# Patient Record
Sex: FEMALE | Race: WHITE | NOT HISPANIC OR LATINO | Employment: OTHER | URBAN - METROPOLITAN AREA
[De-identification: names, ages, dates, MRNs, and addresses within clinical notes are randomized per-mention and may not be internally consistent; named-entity substitution may affect disease eponyms.]

---

## 2023-12-13 ENCOUNTER — OFFICE VISIT (OUTPATIENT)
Dept: HEMATOLOGY/ONCOLOGY | Facility: CLINIC | Age: 83
End: 2023-12-13
Payer: MEDICARE

## 2023-12-13 VITALS
HEART RATE: 76 BPM | TEMPERATURE: 97.9 F | SYSTOLIC BLOOD PRESSURE: 167 MMHG | BODY MASS INDEX: 33.91 KG/M2 | DIASTOLIC BLOOD PRESSURE: 79 MMHG | HEIGHT: 59 IN | RESPIRATION RATE: 18 BRPM | OXYGEN SATURATION: 96 % | WEIGHT: 168.21 LBS

## 2023-12-13 DIAGNOSIS — N60.91 ATYPICAL DUCTAL HYPERPLASIA OF RIGHT BREAST: Primary | ICD-10-CM

## 2023-12-13 DIAGNOSIS — Z13.820 ENCOUNTER FOR SCREENING FOR OSTEOPOROSIS: ICD-10-CM

## 2023-12-13 PROCEDURE — 1126F AMNT PAIN NOTED NONE PRSNT: CPT | Performed by: NURSE PRACTITIONER

## 2023-12-13 PROCEDURE — 99213 OFFICE O/P EST LOW 20 MIN: CPT | Performed by: NURSE PRACTITIONER

## 2023-12-13 PROCEDURE — 1036F TOBACCO NON-USER: CPT | Performed by: NURSE PRACTITIONER

## 2023-12-13 RX ORDER — ANASTROZOLE 1 MG/1
1 TABLET ORAL DAILY
COMMUNITY
End: 2023-12-13 | Stop reason: SDUPTHER

## 2023-12-13 RX ORDER — LOSARTAN POTASSIUM 50 MG/1
50 TABLET ORAL DAILY
COMMUNITY

## 2023-12-13 RX ORDER — HYDROCHLOROTHIAZIDE 25 MG/1
25 TABLET ORAL DAILY
COMMUNITY

## 2023-12-13 RX ORDER — ANASTROZOLE 1 MG/1
1 TABLET ORAL DAILY
Qty: 90 TABLET | Refills: 3 | Status: SHIPPED | OUTPATIENT
Start: 2023-12-13 | End: 2024-12-12

## 2023-12-13 RX ORDER — SIMVASTATIN 40 MG/1
40 TABLET, FILM COATED ORAL NIGHTLY
COMMUNITY

## 2023-12-13 ASSESSMENT — PATIENT HEALTH QUESTIONNAIRE - PHQ9
1. LITTLE INTEREST OR PLEASURE IN DOING THINGS: NOT AT ALL
SUM OF ALL RESPONSES TO PHQ9 QUESTIONS 1 AND 2: 0
2. FEELING DOWN, DEPRESSED OR HOPELESS: NOT AT ALL

## 2023-12-13 ASSESSMENT — COLUMBIA-SUICIDE SEVERITY RATING SCALE - C-SSRS
1. IN THE PAST MONTH, HAVE YOU WISHED YOU WERE DEAD OR WISHED YOU COULD GO TO SLEEP AND NOT WAKE UP?: NO
6. HAVE YOU EVER DONE ANYTHING, STARTED TO DO ANYTHING, OR PREPARED TO DO ANYTHING TO END YOUR LIFE?: NO
2. HAVE YOU ACTUALLY HAD ANY THOUGHTS OF KILLING YOURSELF?: NO

## 2023-12-13 ASSESSMENT — ENCOUNTER SYMPTOMS
DEPRESSION: 0
OCCASIONAL FEELINGS OF UNSTEADINESS: 0
LOSS OF SENSATION IN FEET: 0

## 2023-12-13 ASSESSMENT — PAIN SCALES - GENERAL: PAINLEVEL: 0-NO PAIN

## 2023-12-13 NOTE — PROGRESS NOTES
Patient ID: Vee Mcnulty is a 83 y.o. female.    Cancer History:  Treatment Synopsis:  Routine screening mammogram at [facility] on October 3, 2019. This showed an abnormality in the right breast upper outer quadrant. Subsequent diagnostic study also confirmed a suspicious lesion. 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 This was a grade 2 tumor.     She underwent a lumpectomy with sentinel lymph node biopsy on January 10, 2020, that showed an 8 mm tumor with a positive anterior margin. Zero of one sentinel lymph node involved. She underwent a reexcision on January 29, 2020. There was some residual atypical ductal hyperplasia but otherwise negative.   She omitted radiation therapy.     Anastrazole starting in March 2020    Family History:  Sister with breast cancer in her 70's  Limited knowledge of relatives    Interval History:  Patient returns today for routine follow-up evaluation for history of breast cancer.  She continues on anastrozole and is not experiencing any side effects.  She denies any joint aches complaints hot flashes or mood swings.  She has not had any fever, chills or night sweats.  No cough, chest pain or shortness of breath.  No nausea or vomiting.  No constipation or diarrhea.  She continues to follow with Dr. Coleman for imaging.    Previously followed by Dr. Farias, Dr. Lane and Dr. Uribe    Review of Systems:  A review of systems has been completed and are negative for complaints except what is stated in the HPI and/or past medical history    Social History:  She has two boys, two grandchildren.  20pk year history   Lives with spouse    Allergies:  NKDA    Medications:  Medication list reviewed with patient and updated in EMR    Vital Signs:  /79 (BP Location: Right arm, Patient Position: Sitting, BP Cuff Size: Adult)   Pulse 76   Temp 36.6 °C (97.9 °F) (Temporal)    "Resp 18   Ht 1.49 m (4' 10.66\")   Wt 76.3 kg (168 lb 3.4 oz)   SpO2 96%   BMI 34.37 kg/m²     Physical Exam:  ECOG:  Pain:  Constitutional: Well developed, awake/alert/oriented x3, no distress, alert and cooperative  Eyes: PER. sclera anicteric  ENMT: Oral mucosa moist  Respiratory/Thorax: Breathing is non-labored. Lungs are clear to auscultation bilaterally. No adventitious breath sounds  Cardiovascular: S1-S2. Regular rate and rhythm. No murmurs, rubs, or gallops appreciated  Gastrointestinal: Abdomen soft nontender, nondistended, normal active bowel sounds. No hepatosplenomegly  Musculoskeletal: ROM intact, no joint swelling, normal strength  Extremities: normal extremities, no cyanosis, no edema, no clubbing  Breast: bilateral breast exam completed no masses or lesions appreciated  Lymphatics: no palpable lymphadenopathy   Neurologic: alert and oriented x3. Nonfocal exam. No myoclonus  Psychological: Pleasant, appropriate and easily engaged     Radiology Results:    Mammogram 4/10/2023  IMPRESSION:  No evidence for recurrent malignancy on the right or malignancy on the left.  Screening mammography in 1 year is recommended.        ACR BI-RADS 2:  Benign finding.  _____________________________________________________________________     DEXA 05/27/2022   Lumbar T-Score: -0.6    Z- Score: 2.2  Right femoral neck T-Score: -1.0  Z- Score: 1.5  Total right hip T score: 0.1 Z score: 2.3    Assessment:  1. Right Breast cancer  Early stage cancer  She remains partnered with 5  years of AI therapy (March 2025)  Exam without concern.  Mammogram - Ordered   No new symptoms. MING    2. Bone health  DEXA due May 2024 Ordered   Continue calcium and vitamin D    Plan:  - 6 month follow-up      RUTH Lazo-CNP            "

## 2024-04-11 ENCOUNTER — HOSPITAL ENCOUNTER (OUTPATIENT)
Dept: RADIOLOGY | Facility: HOSPITAL | Age: 84
Discharge: HOME | End: 2024-04-11
Payer: MEDICARE

## 2024-04-11 VITALS — BODY MASS INDEX: 29.45 KG/M2 | HEIGHT: 60 IN | WEIGHT: 150 LBS

## 2024-04-11 DIAGNOSIS — N60.91 ATYPICAL DUCTAL HYPERPLASIA OF RIGHT BREAST: ICD-10-CM

## 2024-04-11 PROCEDURE — 77067 SCR MAMMO BI INCL CAD: CPT

## 2024-04-11 PROCEDURE — 77063 BREAST TOMOSYNTHESIS BI: CPT | Performed by: RADIOLOGY

## 2024-04-11 PROCEDURE — 77067 SCR MAMMO BI INCL CAD: CPT | Performed by: RADIOLOGY

## 2024-05-28 ENCOUNTER — HOSPITAL ENCOUNTER (OUTPATIENT)
Dept: RADIOLOGY | Facility: HOSPITAL | Age: 84
Discharge: HOME | End: 2024-05-28
Payer: MEDICARE

## 2024-05-28 DIAGNOSIS — Z78.0 ASYMPTOMATIC MENOPAUSAL STATE: ICD-10-CM

## 2024-05-28 DIAGNOSIS — Z13.820 ENCOUNTER FOR SCREENING FOR OSTEOPOROSIS: ICD-10-CM

## 2024-05-28 PROCEDURE — 77080 DXA BONE DENSITY AXIAL: CPT | Performed by: RADIOLOGY

## 2024-05-28 PROCEDURE — 77080 DXA BONE DENSITY AXIAL: CPT

## 2024-06-11 ENCOUNTER — OFFICE VISIT (OUTPATIENT)
Dept: HEMATOLOGY/ONCOLOGY | Facility: CLINIC | Age: 84
End: 2024-06-11
Payer: MEDICARE

## 2024-06-11 VITALS
DIASTOLIC BLOOD PRESSURE: 75 MMHG | TEMPERATURE: 97.8 F | HEIGHT: 59 IN | HEART RATE: 73 BPM | OXYGEN SATURATION: 94 % | BODY MASS INDEX: 33.67 KG/M2 | WEIGHT: 167 LBS | SYSTOLIC BLOOD PRESSURE: 162 MMHG | RESPIRATION RATE: 18 BRPM

## 2024-06-11 DIAGNOSIS — N60.91 ATYPICAL DUCTAL HYPERPLASIA OF RIGHT BREAST: Primary | ICD-10-CM

## 2024-06-11 DIAGNOSIS — Z13.820 ENCOUNTER FOR SCREENING FOR OSTEOPOROSIS: ICD-10-CM

## 2024-06-11 PROCEDURE — 1159F MED LIST DOCD IN RCRD: CPT | Performed by: NURSE PRACTITIONER

## 2024-06-11 PROCEDURE — 1160F RVW MEDS BY RX/DR IN RCRD: CPT | Performed by: NURSE PRACTITIONER

## 2024-06-11 PROCEDURE — 99214 OFFICE O/P EST MOD 30 MIN: CPT | Performed by: NURSE PRACTITIONER

## 2024-06-11 PROCEDURE — 1126F AMNT PAIN NOTED NONE PRSNT: CPT | Performed by: NURSE PRACTITIONER

## 2024-06-11 RX ORDER — ANASTROZOLE 1 MG/1
1 TABLET ORAL DAILY
Qty: 90 TABLET | Refills: 3 | Status: SHIPPED | OUTPATIENT
Start: 2024-06-11 | End: 2025-06-11

## 2024-06-11 ASSESSMENT — PATIENT HEALTH QUESTIONNAIRE - PHQ9
SUM OF ALL RESPONSES TO PHQ9 QUESTIONS 1 AND 2: 0
2. FEELING DOWN, DEPRESSED OR HOPELESS: NOT AT ALL
1. LITTLE INTEREST OR PLEASURE IN DOING THINGS: NOT AT ALL

## 2024-06-11 ASSESSMENT — PAIN SCALES - GENERAL: PAINLEVEL: 0-NO PAIN

## 2024-06-11 NOTE — PROGRESS NOTES
Patient ID: Vee Mcnulty is a 83 y.o. female.    Cancer History:  Treatment Synopsis:  Routine screening mammogram at [facility] on October 3, 2019. This showed an abnormality in the right breast upper outer quadrant. Subsequent diagnostic study also confirmed a suspicious lesion. She underwent a biopsy on November 29, 2019, at [facility], which showed invasive mammary carcinoma, ER 95%, NH 95%, HER-2/aly negative. This was a grade 2 tumor.     She underwent a lumpectomy with sentinel lymph node biopsy on January 10, 2020, that showed an 8 mm tumor with a positive anterior margin. Zero of one sentinel lymph node involved. She underwent a reexcision on January 29, 2020. There was some residual atypical ductal hyperplasia but otherwise negative.   She omitted radiation therapy.     Anastrazole starting in March 2020    Family History:  Sister with breast cancer in her 70's  Limited knowledge of relatives    Interval History:  Patient returns today for routine follow-up evaluation for history of breast cancer.  She continues on anastrozole and is not experiencing any side effects.  She denies any joint aches complaints hot flashes or mood swings.  She has not had any fever, chills or night sweats.  No cough, chest pain or shortness of breath.  No nausea or vomiting.  No constipation or diarrhea.  She continues to follow with Dr. Coleman for imaging.    Previously followed by Dr. Farias, Dr. Lane and Dr. Uribe    Review of Systems:  A review of systems has been completed and are negative for complaints except what is stated in the HPI and/or past medical history    Social History:  She has two boys, two grandchildren.  20pk year history   Lives with spouse    Allergies:  NKDA    Medications:  Medication list reviewed with patient and updated in EMR    Vital Signs:  /75 (BP Location: Left arm, Patient Position: Sitting, BP Cuff Size: Adult)   Pulse 73   Temp 36.6 °C (97.8 °F) (Temporal)    "Resp 18   Ht (S) 1.493 m (4' 10.78\")   Wt 75.8 kg (167 lb)   SpO2 94%   BMI 33.98 kg/m²     Physical Exam:  ECO  Pain: 0  Constitutional: Well developed, awake/alert/oriented x3, no distress, alert and cooperative  Eyes: PER. sclera anicteric  ENMT: Oral mucosa moist  Respiratory/Thorax: Breathing is non-labored. Lungs are clear to auscultation bilaterally. No adventitious breath sounds  Cardiovascular: S1-S2. Regular rate and rhythm. No murmurs, rubs, or gallops appreciated  Gastrointestinal: Abdomen soft nontender, nondistended, normal active bowel sounds. No hepatosplenomegly  Musculoskeletal: ROM intact, no joint swelling, normal strength  Extremities: normal extremities, no cyanosis, no edema, no clubbing  Breast: bilateral breast exam completed no masses or lesions appreciated. Right breast with surgical changes/scaring.   Lymphatics: no palpable lymphadenopathy   Neurologic: alert and oriented x3. Nonfocal exam. No myoclonus  Psychological: Pleasant, appropriate and easily engaged     Radiology Results:    Interpreted By:  Hali Rojas,   STUDY:   BI MAMMO BILATERAL SCREENING TOMOSYNTHESIS;  2024 12:05 pm       ACCESSION NUMBER(S):   [id number]      ORDERING CLINICIAN:   KEVIN VICTOR       INDICATION:   Screening. Best images possible. History of a right lumpectomy. 18 pounds weight loss. Family history of breast cancer with her sister diagnosed.      COMPARISON:   04/10/2023, 2022, 10/03/2019       FINDINGS:   2D and tomosynthesis images were reviewed at 1 mm slice thickness.       Density:  There are areas of scattered fibroglandular tissue.       Scarring is noted in the right subareolar location, stable. No suspicious masses or calcifications are identified.       IMPRESSION:   No mammographic evidence of malignancy. Clinical correlation is recommended for the weight loss.       BI-RADS CATEGORY:       BI-RADS Category:  2 Benign.   Recommendation:  Annual Screening.   Recommended " Date:  1 Year.   Laterality:  Bilateral.       For any future breast imaging appointments, please call [phone]).        MACRO:   None      Signed by: Hali Rojas 4/16/2024 7:28 AM   Dictation workstation:   [id number]   _____________________________________________________________________  Interpreted By:  Estela Marlow,   STUDY:   DEXA BONE DENSITY 5/28/2024 3:10 pm       INDICATION:   Signs/Symptoms:osteoporosis screening on AI. The patient is a 82 y/o year old F. Postmenopausal denying hormonal replacement therapy       COMPARISON:   05/27/2022      ACCESSION NUMBER(S):   [id number]      ORDERING CLINICIAN:   KEVIN VICTOR       TECHNIQUE:   DEXA BONE DENSITY       FINDINGS:   SPINE L1-L4   Bone Mineral Density: 0.999 g/cm2   T-Score -0.4  Z-Score 2.4   Bone Mineral Density change vs baseline:  -5.8 %   Bone Mineral Density change vs previous: 1.2 %       LEFT FEMUR -TOTAL   Bone Mineral Density: 0.887 grams/centimeter squared   T-Score -0.5 Z-Score 1.9   Bone Mineral Density change vs baseline: -9.2%   Bone Mineral Density change vs previous: -7.2%       LEFT FEMUR -NECK   Bone Mineral Density: 0.757 grams/centimeter squared   T-Score -0.8 Z-Score 1.7   Bone Mineral Density change vs baseline: Not reported   Bone Mineral Density change vs previous: Not reported         World Health Organization (WHO) criteria for post-menopausal,    Women:   Normal:         T-score at or above -1 SD   Osteopenia:   T-score between -1 and -2.5 SD   Osteoporosis: T-score at or below -2.5 SD         10-year Fracture Risk:   Major Osteoporotic Fracture 11 % without prior fracture and 16% with   prior fracture Hip Fracture 2.3 % without   prior fracture and 3.0% with prior fracture       Note:  If no FRAX score is reported, it is because:   Some T-score for Spine Total or Hip Total or Femoral Neck at or below -2.5      This exam was performed at [facility] on a [employer] Dexa Unit.        IMPRESSION:   DEXA: According to World Health Organization criteria, classification is normal.       Followup recommended in 2 years or sooner as clinically warranted.       All images and detailed analysis are available on the  Radiology PACS.      MACRO:   None      Assessment:  1. Right Breast cancer  Early stage cancer  She remains partnered with 5  years of AI therapy (March 2025)  Exam without concern.  Mammogram - Ordered   No new symptoms. MING    2. Bone health  DEXA May 2024 normal mineralization    Continue calcium and vitamin D    Plan:  - 6 month follow-up      Cezar Noland, RUTH-CNP

## 2024-06-11 NOTE — PROGRESS NOTES
Patient ambulated into clinic for follow up with Cezar RED. Medications and allergies reviewed. Refill on anastrozole needed.

## 2024-12-09 ENCOUNTER — APPOINTMENT (OUTPATIENT)
Dept: HEMATOLOGY/ONCOLOGY | Facility: CLINIC | Age: 84
End: 2024-12-09
Payer: MEDICARE

## 2025-04-01 NOTE — PROGRESS NOTES
Patient ID: Vee Mcnulty is a 84 y.o. female.    The patient presents to clinic today for her history of breast cancer.     Cancer Staging   No matching staging information was found for the patient.    Diagnostic/Therapeutic History:  Routine screening mammogram at [facility] on October 3, 2019. This showed an abnormality in the right breast upper outer quadrant. Subsequent diagnostic study also confirmed a suspicious lesion. She underwent a biopsy on November 29, 2019, at [facility], which showed invasive mammary carcinoma, ER 95%, MT 95%, HER-2/aly negative. This was a grade 2 tumor.      She underwent a lumpectomy with sentinel lymph node biopsy on January 10, 2020, that showed an 8 mm tumor with a positive anterior margin. Zero of one sentinel lymph node involved. She underwent a reexcision on January 29, 2020. There was some residual atypical ductal hyperplasia but otherwise negative.   She omitted radiation therapy.      Anastrazole starting in March 2020 - April 2025     Family History:  Sister with breast cancer in her 70's  Limited knowledge of relatives    History of Present Illness (HPI)/Interval History:  Ms. Vee Mcnulty presents for presents today for follow-up, treatment and surveillance. She is compliant on anastrozole.     She denies any new breast cancer concerns.     She denies any chest pain or breathing issues.     She denies any vision changes or headache issues, dizziness, loss of balance or falls.     She denies any new or unexplained bone aches or pains.    She denies any skin lesions or masses.    She reports a normal appetite and normal bowel movements.    She denies any issues with sleep, fatigue, hot flashes or mood swings.     Review of Systems:  14-point ROS otherwise negative, as per HPI.    Past Medical History:   Diagnosis Date    Breast cancer (Multi)     RT breast       Past Surgical History:   Procedure Laterality Date    BI MAMMO GUIDED BREAST RIGHT LOCALIZATION  Right 2020    BI MAMMO GUIDED LOCALIZATION BREAST RIGHT LAK SURG AIB LEGACY    BI US GUIDED BREAST LOCALIZATION AND BIOPSY RIGHT Right 2019    BI US GUIDED BREAST LOCALIZATION AND BIOPSY RIGHT LAK CLINICAL LEGACY       Social History     Socioeconomic History    Marital status:    Tobacco Use    Smoking status: Never    Smokeless tobacco: Never   Vaping Use    Vaping status: Never Used   Substance and Sexual Activity    Alcohol use: Not Currently    Drug use: Never       No Known Allergies      Current Outpatient Medications:     anastrozole (Arimidex) 1 mg tablet, Take 1 tablet (1 mg total) by mouth once daily.  Swallow whole with a drink of water., Disp: 90 tablet, Rfl: 3    calcium carbonate/vitamin D3 (CALTRATE 600 PLUS D ORAL), Take 600 mg by mouth 2 times a day., Disp: , Rfl:     hydroCHLOROthiazide (HYDRODiuril) 25 mg tablet, Take 1 tablet (25 mg) by mouth once daily., Disp: , Rfl:     losartan (Cozaar) 50 mg tablet, Take 1 tablet (50 mg) by mouth once daily., Disp: , Rfl:     simvastatin (Zocor) 40 mg tablet, Take 1 tablet (40 mg) by mouth once daily at bedtime., Disp: , Rfl:      Objective    BSA: 1.74 meters squared  /66 (BP Location: Left arm, Patient Position: Sitting, BP Cuff Size: Adult long)   Pulse 84   Temp 36.3 °C (97.3 °F) (Temporal)   Resp 16   Wt 73.4 kg (161 lb 13.1 oz)   SpO2 94%   BMI 32.93 kg/m²     Performance Status:  The ECOG performance scale today is ECO- Fully active, able to carry on all pre-disease performance w/o restriction.    Physical Exam  Vitals reviewed.   Constitutional:       General: She is awake. She is not in acute distress.     Appearance: Normal appearance. She is not toxic-appearing.   HENT:      Head: Normocephalic and atraumatic.      Mouth/Throat:      Mouth: Mucous membranes are moist.      Pharynx: Oropharynx is clear.   Eyes:      Conjunctiva/sclera: Conjunctivae normal.      Pupils: Pupils are equal, round, and reactive to  light.   Neck:      Trachea: Trachea and phonation normal. No tracheal tenderness.   Cardiovascular:      Rate and Rhythm: Normal rate and regular rhythm.      Pulses: Normal pulses.      Heart sounds: Normal heart sounds. No murmur heard.  Pulmonary:      Effort: Pulmonary effort is normal. No respiratory distress.      Breath sounds: Normal breath sounds.   Chest:      Chest wall: No mass or deformity.   Breasts:     Right: No swelling, mass, nipple discharge, skin change or tenderness.      Left: No swelling, mass, nipple discharge, skin change or tenderness.          Comments: S/p right lumpectomy with skin dimpling at the lumpectomy side in the upper outer breast   Abdominal:      General: Bowel sounds are normal. There is no distension.      Palpations: Abdomen is soft. There is no mass.      Tenderness: There is no abdominal tenderness. There is no guarding.   Musculoskeletal:         General: Normal range of motion.      Cervical back: Normal range of motion.   Lymphadenopathy:      Upper Body:      Right upper body: No supraclavicular, axillary or pectoral adenopathy.      Left upper body: No supraclavicular, axillary or pectoral adenopathy.   Skin:     General: Skin is warm and dry.      Capillary Refill: Capillary refill takes less than 2 seconds.      Findings: No rash.   Neurological:      General: No focal deficit present.      Mental Status: She is alert and oriented to person, place, and time.      Motor: No weakness.   Psychiatric:         Mood and Affect: Mood normal.         Behavior: Behavior normal.         Thought Content: Thought content normal.         Judgment: Judgment normal.         Laboratory Data:  Lab Results   Component Value Date    WBC 8.3 01/03/2020    HGB 15.0 01/03/2020    HCT 45.2 (H) 01/03/2020    MCV 96.4 01/03/2020     01/03/2020       Chemistry    Lab Results   Component Value Date/Time     01/03/2020 1132    K 3.7 01/03/2020 1132    CL 99 01/03/2020 1132    CO2  26 01/03/2020 1132    BUN 25 01/03/2020 1132    CREATININE 0.9 01/03/2020 1132    Lab Results   Component Value Date/Time    CALCIUM 10.0 01/03/2020 1132    ALKPHOS 89 01/03/2020 1132    AST 21 01/03/2020 1132    ALT 26 01/03/2020 1132    BILITOT 0.9 01/03/2020 1132             Radiology:  XR DEXA bone density  Narrative: Interpreted By:  Estela Marlow,   STUDY:  DEXA BONE DENSITY5/28/2024 3:10 pm      INDICATION:  Signs/Symptoms:osteoporosis screening on AI. The patient is a 82 y/o  year old F. Postmenopausal denying hormonal replacement therapy      COMPARISON:  05/27/2022      ACCESSION NUMBER(S):  [id number]      ORDERING CLINICIAN:  KEVIN VICTOR      TECHNIQUE:  DEXA BONE DENSITY      FINDINGS:  SPINE L1-L4  Bone Mineral Density: 0.999 g/cm2  T-Score -0.4  Z-Score 2.4  Bone Mineral Density change vs baseline:  -5.8 %  Bone Mineral Density change vs previous: 1.2 %      LEFT FEMUR -TOTAL  Bone Mineral Density: 0.887 grams/centimeter squared  T-Score -0.5 Z-Score 1.9  Bone Mineral Density change vs baseline: -9.2%  Bone Mineral Density change vs previous: -7.2%      LEFT FEMUR -NECK  Bone Mineral Density: 0.757 grams/centimeter squared  T-Score -0.8 Z-Score 1.7  Bone Mineral Density change vs baseline: Not reported  Bone Mineral Density change vs previous: Not reported          World Health Organization (WHO) criteria for post-menopausal,   Women:  Normal:         T-score at or above -1 SD  Osteopenia:   T-score between -1 and -2.5 SD  Osteoporosis: T-score at or below -2.5 SD          10-year Fracture Risk:  Major Osteoporotic Fracture  11 % without prior fracture and 16% with  prior fracture Hip Fracture                        2.3 % without  prior fracture and 3.0% with prior fracture      Note:  If no FRAX score is reported, it is because:  Some T-score for Spine Total or Hip Total or Femoral Neck at or below  -2.5      This exam was performed at [facility] on a [employer]  Dexa Unit.      Impression: DEXA:  According to World Health Organization criteria,  classification is normal.      Followup recommended in 2 years or sooner as clinically warranted.      All images and detailed analysis are available on the  Radiology  PACS.      MACRO:  None      Signed by: Estela Marlow 5/29/2024 11:35 AM  Dictation workstation:   [id number]       BI mammo bilateral screening tomosynthesis 04/11/2024    Narrative  Interpreted By:  Hali Rojas,  STUDY:  BI MAMMO BILATERAL SCREENING TOMOSYNTHESIS;  4/11/2024 12:05 pm    ACCESSION NUMBER(S):  [id number]    ORDERING CLINICIAN:  KEVIN VICTOR    INDICATION:  Screening. Best images possible. History of a right lumpectomy. 18  pounds weight loss. Family history of breast cancer with her sister  diagnosed.    COMPARISON:  04/10/2023, 04/01/2022, 10/03/2019    FINDINGS:  2D and tomosynthesis images were reviewed at 1 mm slice thickness.    Density:  There are areas of scattered fibroglandular tissue.    Scarring is noted in the right subareolar location, stable. No  suspicious masses or calcifications are identified.    Impression  No mammographic evidence of malignancy. Clinical correlation is  recommended for the weight loss.    BI-RADS CATEGORY:    BI-RADS Category:  2 Benign.  Recommendation:  Annual Screening.  Recommended Date:  1 Year.  Laterality:  Bilateral.    For any future breast imaging appointments, please call [phone]).        MACRO:  None    Signed by: Hali Rojas 4/16/2024 7:28 AM  Dictation workstation:   [id number]     Assessment/Plan:    Vee Mcnulty is a 84 y.o. female with a history of right breast cancer, who presents today follow-up evaluation.    1. Right Breast cancer  - Complete 5 years of AI therapy (April 2 2025)  - Mammogram next due after 4/11/2025, ordered today   - Continue with observation only for a total of 10 years (~ 2030)     There is no evidence of breast cancer recurrence based on her  clinical exam today.     2. Bone health  DEXA May 2024 normal mineralization    - Continue calcium and vitamin D  - Next DEXA to be completed June 2026    Disposition.  - RTC 1 year   - She was given our contact information and instructed to call with concerns/questions in the interim.    Orders Placed This Encounter   Procedures    BI mammo bilateral screening tomosynthesis     Standing Status:   Future     Standing Expiration Date:   6/2/2026     Order Specific Question:   Reason for exam:     Answer:   screening mammogram / hx of breast cancer     Order Specific Question:   Radiologist to Determine Optimal Study     Answer:   Yes     Order Specific Question:   Release result to [employer]     Answer:   Immediate [1]     Order Specific Question:   Is this exam part of a Research Study? If Yes, link this order to the research study     Answer:   No             Maritza Huggins PA-C  Hematology and Medical Oncology  [facility]

## 2025-04-02 ENCOUNTER — OFFICE VISIT (OUTPATIENT)
Dept: HEMATOLOGY/ONCOLOGY | Facility: CLINIC | Age: 85
End: 2025-04-02
Payer: MEDICARE

## 2025-04-02 VITALS
OXYGEN SATURATION: 94 % | RESPIRATION RATE: 16 BRPM | WEIGHT: 161.82 LBS | HEART RATE: 84 BPM | TEMPERATURE: 97.3 F | SYSTOLIC BLOOD PRESSURE: 160 MMHG | DIASTOLIC BLOOD PRESSURE: 66 MMHG | BODY MASS INDEX: 32.93 KG/M2

## 2025-04-02 DIAGNOSIS — C50.911 MALIGNANT NEOPLASM OF RIGHT BREAST IN FEMALE, ESTROGEN RECEPTOR POSITIVE, UNSPECIFIED SITE OF BREAST: ICD-10-CM

## 2025-04-02 DIAGNOSIS — N60.91 ATYPICAL DUCTAL HYPERPLASIA OF RIGHT BREAST: Primary | ICD-10-CM

## 2025-04-02 DIAGNOSIS — Z12.31 SCREENING MAMMOGRAM FOR BREAST CANCER: ICD-10-CM

## 2025-04-02 DIAGNOSIS — Z17.0 MALIGNANT NEOPLASM OF RIGHT BREAST IN FEMALE, ESTROGEN RECEPTOR POSITIVE, UNSPECIFIED SITE OF BREAST: ICD-10-CM

## 2025-04-02 PROCEDURE — 1126F AMNT PAIN NOTED NONE PRSNT: CPT

## 2025-04-02 PROCEDURE — 1159F MED LIST DOCD IN RCRD: CPT

## 2025-04-02 PROCEDURE — 99213 OFFICE O/P EST LOW 20 MIN: CPT

## 2025-04-02 ASSESSMENT — PAIN SCALES - GENERAL: PAINLEVEL_OUTOF10: 0-NO PAIN

## 2025-04-02 NOTE — PROGRESS NOTES
Patient here for follow up with Maritza Aldridge. Medications and allergies reviewed with patient.     Patient is compliant on anastrozole, she denies joint pain or hot flashes. She denies pain, nausea, vomiting, diarrhea, constipation, difficulty eating, weight loss, breast changes or abnormalities.    Per orders patient can discontinue anastrozole as she has completed 5 years of therapy.   Mammogram due in April 2025 and will follow up in 1 year.     Patient verbalized understanding using the teach back method, no further questions at this time.

## 2025-04-16 ENCOUNTER — HOSPITAL ENCOUNTER (OUTPATIENT)
Dept: RADIOLOGY | Facility: HOSPITAL | Age: 85
Discharge: HOME | End: 2025-04-16
Payer: MEDICARE

## 2025-04-16 DIAGNOSIS — Z12.31 SCREENING MAMMOGRAM FOR BREAST CANCER: ICD-10-CM

## 2025-04-16 DIAGNOSIS — C50.911 MALIGNANT NEOPLASM OF RIGHT BREAST IN FEMALE, ESTROGEN RECEPTOR POSITIVE, UNSPECIFIED SITE OF BREAST: ICD-10-CM

## 2025-04-16 DIAGNOSIS — Z17.0 MALIGNANT NEOPLASM OF RIGHT BREAST IN FEMALE, ESTROGEN RECEPTOR POSITIVE, UNSPECIFIED SITE OF BREAST: ICD-10-CM

## 2025-04-16 PROCEDURE — 77063 BREAST TOMOSYNTHESIS BI: CPT | Performed by: STUDENT IN AN ORGANIZED HEALTH CARE EDUCATION/TRAINING PROGRAM

## 2025-04-16 PROCEDURE — 77067 SCR MAMMO BI INCL CAD: CPT

## 2025-04-16 PROCEDURE — 77067 SCR MAMMO BI INCL CAD: CPT | Performed by: STUDENT IN AN ORGANIZED HEALTH CARE EDUCATION/TRAINING PROGRAM

## 2025-04-17 ENCOUNTER — TELEPHONE (OUTPATIENT)
Dept: HEMATOLOGY/ONCOLOGY | Facility: CLINIC | Age: 85
End: 2025-04-17
Payer: MEDICARE

## 2025-04-17 NOTE — TELEPHONE ENCOUNTER
Called and left message for patient on voicemail. Per Maritza WALSH mammogram looks good and can continue with yearly screenings. If she has any questions she can call [phone].